# Patient Record
Sex: FEMALE | Race: WHITE
[De-identification: names, ages, dates, MRNs, and addresses within clinical notes are randomized per-mention and may not be internally consistent; named-entity substitution may affect disease eponyms.]

---

## 2018-02-01 ENCOUNTER — HOSPITAL ENCOUNTER (EMERGENCY)
Dept: HOSPITAL 68 - ERH | Age: 43
End: 2018-02-01
Payer: SELF-PAY

## 2018-02-01 VITALS — SYSTOLIC BLOOD PRESSURE: 104 MMHG | DIASTOLIC BLOOD PRESSURE: 70 MMHG

## 2018-02-01 DIAGNOSIS — R10.31: Primary | ICD-10-CM

## 2018-02-01 LAB
ABSOLUTE GRANULOCYTE CT: 4.8 /CUMM (ref 1.4–6.5)
BASOPHILS # BLD: 0.1 /CUMM (ref 0–0.2)
BASOPHILS NFR BLD: 1.1 % (ref 0–2)
EOSINOPHIL # BLD: 0.2 /CUMM (ref 0–0.7)
EOSINOPHIL NFR BLD: 2.2 % (ref 0–5)
ERYTHROCYTE [DISTWIDTH] IN BLOOD BY AUTOMATED COUNT: 12.4 % (ref 11.5–14.5)
GRANULOCYTES NFR BLD: 55.5 % (ref 42.2–75.2)
HCT VFR BLD CALC: 34.5 % (ref 37–47)
LYMPHOCYTES # BLD: 3 /CUMM (ref 1.2–3.4)
MCH RBC QN AUTO: 32.5 PG (ref 27–31)
MCHC RBC AUTO-ENTMCNC: 34.5 G/DL (ref 33–37)
MCV RBC AUTO: 94.2 FL (ref 81–99)
MONOCYTES # BLD: 0.5 /CUMM (ref 0.1–0.6)
PLATELET # BLD: 284 /CUMM (ref 130–400)
PMV BLD AUTO: 8.2 FL (ref 7.4–10.4)
RED BLOOD CELL CT: 3.66 /CUMM (ref 4.2–5.4)
WBC # BLD AUTO: 8.6 /CUMM (ref 4.8–10.8)

## 2018-02-01 NOTE — CT SCAN REPORT
EXAMINATION:
CT ABDOMEN AND PELVIS WITHOUT CONTRAST
 
CLINICAL INFORMATION:
Severe right flank pain. Rule out renal stone.
 
COMPARISON:
None
 
TECHNIQUE:
Multidetector volumetric imaging was performed from the superior aspect of
the liver through the pubic symphysis. Sagittal and coronal reformatted
images were obtained on the technologist's workstation.
 
DLP:
328 mGy-cm
 
FINDINGS:
 
LUNG BASES: The visualized lung bases are unremarkable. Bilateral breast
implants noted.
 
LIVER, GALLBLADDER, AND BILIARY TREE: The liver is normal in size, shape, and
attenuation. No focal hepatic lesion or biliary ductal dilatation is present.
The gallbladder is unremarkable with no evidence of radiopaque gallstones,
gallbladder wall thickening, or obvious pericholecystic inflammatory changes.
 
 
PANCREAS: Unremarkable.
 
SPLEEN: Unremarkable.
 
ADRENAL GLANDS: Unremarkable.
 
KIDNEYS AND URETERS: The kidneys are normal in size, shape, and attenuation.
No hydronephrosis, hydroureter, or calculi seen. No perinephric stranding.
 
BLADDER: Unremarkable.
 
GASTROINTESTINAL TRACT: The stomach is unremarkable. The small bowel is
normal in caliber without obstruction. There is a normal appendix. No colonic
wall thickening or inflammatory change. Mild colonic diverticulosis without
diverticulitis. Mild colonic stool burden. No free air or free fluid.
 
ABDOMINAL WALL: No significant hernia is appreciated.
 
LYMPH NODES: Normal.
 
VASCULAR: Normal caliber aorta with mild atherosclerotic calcifications.
 
PELVIC VISCERA: The uterus and adnexa are unremarkable.
 
OSSEOUS STRUCTURES: No acute or suspicious osseous abnormality. Mild
degenerative changes at L4-L5 with vacuum disc phenomenon noted.
 
IMPRESSION:
No acute findings of the abdomen or pelvis. No hydronephrosis or
nephrolithiasis.
 
Mild atherosclerotic vascular calcifications are noted.

## 2018-02-01 NOTE — ED GI/GU/ABDOMINAL COMPLAINT
History of Present Illness
 
General
Chief Complaint: Abdominal Pain/Flank Pain
Stated Complaint: PER PT "SEVERE RT LOWER ABD OAIN,SEVERE"
Source: patient
Exam Limitations: no limitations
 
Vital Signs & Intake/Output
Vital Signs & Intake/Output
 Vital Signs
 
 
Date Time Temp Pulse Resp B/P B/P Pulse O2 O2 Flow FiO2
 
     Mean Ox Delivery Rate 
 
 0431 98.1 79 24 126/91  98   
 
 
 
Allergies
Coded Allergies:
Penicillins (Severe, ANAPHYLAXIS 18)
 
Reconcile Medications
Cyclobenzaprine HCl 10 MG TABLET   1 TAB PO TID PRN SPASM
     DO NOT DRIVE WITH THIS MEDICATION
[LIPOSENE] 1 TAB PO DAILY DIET  (Reported)
Methylprednisolone. (Medrol) 4 MG TAB.DS.PK   1 DP PO AD INFLAMMATION
     6 on day 1 then reduce by one tablet daily until gone
Naproxen 500 MG TABLET   1 TAB PO BID PRN PAIN
     TAKE WITH FOOD
 
Triage Note:
PER PT RT LOWER BACK PAIN UNLIKE USUAL BLOWN DISC
 PAIN STARTED AT 2230, STARTED SLIGHT NOW UNABLE TO
 TAKE IT.
 PT WITH HX OF DISC PROBLEM
 DOES NOT GET PERIOD HX OF TUBSL LIGATION
Triage Nurses Notes Reviewed? yes
Pregnant? N
Is pt currently breastfeeding? No
Onset: Abrupt
Duration: hour(s): (2)
Timing: multiple episodes today
Severity Numbers: 10
Location: right flank
Radiation: no radiation
Activities at Onset: sleep
Prior Abdominal Problems: similar symptoms
No Modifying Factors: none
HPI:
This is a 42-year-old female who presents to the ER with chief complaint of 
severe sudden onset of right flank pain approximately 2 hours ago.  Positive 
associated nausea.  She checked taking Vicodin which she had at home without any
relief.  No vomiting or diarrhea.  No radiation of the pain.  She was sleeping 
when it started.  Denies any dysuria hematuria or frequency.  Denies any trauma 
to the abdomen.  She is status post bilateral tubal ligation denies chance of 
pregnancy.  She has a history of previous disc herniation but she states this 
feels nothing like her previous disc problems.  No history of kidney stones that
she is aware of.  She is a daily drinker.
 
Past History
 
Travel History
Traveled to [place] past 21 day No
 
Medical History
Any Pertinent Medical History? see below for history
Neurological: NONE
EENT: NONE
Cardiovascular: NONE
Respiratory: NONE
Gastrointestinal: NONE
Hepatic: NONE
Renal: NONE
Musculoskeletal: DISC PROBLEM
Psychiatric: NONE
Endocrine: NONE
 
Surgical History
Surgical History: tubal ligation, CERVIX FROZEN
 
Psychosocial History
What is your primary language English
Tobacco Use: Current Daily Use
Daily Tobacco Use Amount/Type: => 5 Cigarettes daily
ETOH Use: DAILY USE 
 
Family History
Hx Contributory? No
 
Review of Systems
 
Review of Systems
Constitutional:
Denies: chills, fever, malaise, weakness. 
EENTM:
Reports: no symptoms. 
Respiratory:
Denies: cough, short of breath, sputum production. 
Cardiovascular:
Denies: chest pain. 
GI:
Denies: abdominal pain, nausea, vomiting. 
Genitourinary:
Denies: discharge, dysuria, frequency, hematuria. 
Musculoskeletal:
Reports: back pain. 
Skin:
Reports: no symptoms. 
Neurological/Psychological:
Reports: no symptoms. 
Hematologic/Endocrine:
Denies: bruising, bleeding, polyuria, polydipsia. 
Immunologic/Allergic:
Denies: splenectomy. 
All Other Systems: Reviewed and Negative
 
Physical Exam
 
Physical Exam
General Appearance: well developed/nourished, alert, awake, anxious, mild 
distress, moderate distress
Head: atraumatic, normal appearance
Eyes:
Bilateral: normal appearance, PERRL, EOMI. 
Ears, Nose, Throat, Mouth: hearing grossly normal, moist mucous membrane
Neck: normal inspection, supple, full range of motion
Respiratory: normal breath sounds, chest non-tender, no respiratory distress
Cardiovascular: regular rate/rhythm, normal peripheral pulses
Gastrointestinal: normal bowel sounds, soft, non-tender
Back: CVA tenderness (R)
Extremities: normal range of motion
Neurologic/Psych: no motor/sensory deficits, awake, alert, oriented x 3, normal 
gait
Skin: intact, normal color, warm/dry
 
Core Measures
ACS in differential dx? No
Sepsis Present: No
Sepsis Focused Exam Completed? No
 
Progress
Differential Diagnosis: kidney stone, UTI/pyelo
Plan of Care:
 Orders
 
 
Procedure Date/time Status
 
Add-on Test (ER Only)  07 Active
 
Add-on Test (ER Only) 441 Active
 
HUMAN BETA HCG SCREEN  044 Complete
 
URINALYSIS  0433 Complete
 
COMPREHENSIVE METABOLIC PANEL  0433 Complete
 
CBC WITHOUT DIFFERENTIAL  0433 Complete
 
 
 Laboratory Tests
 
 
18 0615:
Urinalysis MOD  H, Urine Color YEL, Urine Clarity CLDY  H, Urine pH 6.0, Ur 
Specific Gravity 1.020, Urine Protein NEG, Urine Ketones NEG, Urine Nitrite NEG,
Urine Bilirubin NEG, Urine Urobilinogen 0.2, Ur Leukocyte Esterase SMALL  H, Ur 
Microscopic SEDIMENT EXAMINED, Urine RBC 1-3, Urine WBC 5-10  H, Ur Epithelial 
Cells MANY  H, Urine Bacteria MANY  H, Urine Mucus FEW, Urine Hemoglobin NEG, 
Urine Glucose NEG
 
18 0440:
Anion Gap 16, Estimated GFR > 60, BUN/Creatinine Ratio 20.0, Glucose 96, Calcium
9.5, Total Bilirubin < 0.1  L, AST 20, ALT 32, Alkaline Phosphatase 71, Total 
Protein 6.5, Albumin 4.1, Globulin 2.4, Albumin/Globulin Ratio 1.7, Total Beta 
HCG NEGATIVE, CBC w Diff NO MAN DIFF REQ, RBC 3.66  L, MCV 94.2, MCH 32.5  H, 
MCHC 34.5, RDW 12.4, MPV 8.2, Gran % 55.5, Lymphocytes % 35.4, Monocytes % 5.8, 
Eosinophils % 2.2, Basophils % 1.1, Absolute Granulocytes 4.8, Absolute 
Lymphocytes 3.0, Absolute Monocytes 0.5, Absolute Eosinophils 0.2, Absolute 
Basophils 0.1
 
Diagnostic Imaging:
Viewed by Me: CT Scan.  Discussed w/RAD: CT Scan. 
Radiology Impression: PATIENT: DARRIN LAZCANO  MEDICAL RECORD NO: [id number] 
PRESENT AGE: 42  PATIENT ACCOUNT NO: [id number] : 74  LOCATION: [facility] 
ORDERING PHYSICIAN: Tamika Morton MD     SERVICE DATE:  EXAM TYPE: 
CAT - CT ABD & PELVIS W/O IV CONTRAS EXAMINATION: CT ABDOMEN AND PELVIS WITHOUT 
CONTRAST CLINICAL INFORMATION: Severe right flank pain. Rule out renal stone. 
COMPARISON: None TECHNIQUE: Multidetector volumetric imaging was performed from 
the superior aspect of the liver through the pubic symphysis. Sagittal and 
coronal reformatted images were obtained on the technologist's workstation. DLP:
328 mGy-cm FINDINGS: LUNG BASES: The visualized lung bases are unremarkable. 
Bilateral breast implants noted. LIVER, GALLBLADDER, AND BILIARY TREE: The liver
is normal in size, shape, and attenuation. No focal hepatic lesion or biliary 
ductal dilatation is present. The gallbladder is unremarkable with no evidence 
of radiopaque gallstones, gallbladder wall thickening, or obvious 
pericholecystic inflammatory changes. PANCREAS: Unremarkable. SPLEEN: 
Unremarkable. ADRENAL GLANDS: Unremarkable. KIDNEYS AND URETERS: The kidneys are
normal in size, shape, and attenuation. No hydronephrosis, hydroureter, or 
calculi seen. No perinephric stranding. BLADDER: Unremarkable. GASTROINTESTINAL 
TRACT: The stomach is unremarkable. The small bowel is normal in caliber without
obstruction. There is a normal appendix. No colonic wall thickening or 
inflammatory change. Mild colonic diverticulosis without diverticulitis. Mild 
colonic stool burden. No free air or free fluid. ABDOMINAL WALL: No significant 
hernia is appreciated. LYMPH NODES: Normal. VASCULAR: Normal caliber aorta with 
mild atherosclerotic calcifications. PELVIC VISCERA: The uterus and adnexa are 
unremarkable. OSSEOUS STRUCTURES: No acute or suspicious osseous abnormality. 
Mild degenerative changes at L4-L5 with vacuum disc phenomenon noted. IMPRESSION
: No acute findings of the abdomen or pelvis. No hydronephrosis or 
nephrolithiasis. Mild atherosclerotic vascular calcifications are noted. 
DICTATED BY: Christiano Ross MD  DATE/TIME DICTATED:18 
:RAD.HARRIS  DATE/TIME TRANSCRIBED:18 CONFIDENTIAL, 
DO NOT COPY WITHOUT APPROPRIATE AUTHORIZATION.  <Electronically signed in Other 
Vendor System>                                                                  
                     SIGNED BY: Christiano Ross MD 18 0648
Initial ED EKG: none
 
Departure
 
Departure
Time of Disposition: 658
Disposition: HOME OR SELF CARE
Condition: Stable
Clinical Impression
Primary Impression: Flank pain, acute
Referrals:
Elvin CHOI,Colin OLIVER (PCP/Family)
 
Additional Instructions:
TAKE THE NAPROXEN, FLEXERIL AND PREDNISONE AS DIRECTED
FOLLOW UP WITH YOUR DOCTOR IN THE OFFICE
Departure Forms:
Customer Survey
General Discharge Information
Prescriptions:
Current Visit Scripts
Naproxen 1 TAB PO BID PRN PAIN 
     #30 TAB 
     TAKE WITH FOOD
 
Cyclobenzaprine HCl 1 TAB PO TID PRN SPASM 
     #30 TAB 
     DO NOT DRIVE WITH THIS MEDICATION
 
Methylprednisolone. (Medrol) 1 DP PO AD  
     #1 DP 
     6 on day 1 then reduce by one tablet daily until gone